# Patient Record
Sex: MALE | Race: WHITE | HISPANIC OR LATINO | URBAN - METROPOLITAN AREA
[De-identification: names, ages, dates, MRNs, and addresses within clinical notes are randomized per-mention and may not be internally consistent; named-entity substitution may affect disease eponyms.]

---

## 2023-08-29 ENCOUNTER — OFFICE VISIT (OUTPATIENT)
Dept: URGENT CARE | Facility: URGENT CARE | Age: 28
End: 2023-08-29

## 2023-08-29 ENCOUNTER — APPOINTMENT (OUTPATIENT)
Dept: GENERAL RADIOLOGY | Facility: CLINIC | Age: 28
End: 2023-08-29
Attending: EMERGENCY MEDICINE

## 2023-08-29 ENCOUNTER — HOSPITAL ENCOUNTER (EMERGENCY)
Facility: CLINIC | Age: 28
Discharge: HOME | End: 2023-08-29
Attending: EMERGENCY MEDICINE | Admitting: EMERGENCY MEDICINE

## 2023-08-29 VITALS
DIASTOLIC BLOOD PRESSURE: 67 MMHG | TEMPERATURE: 98.3 F | RESPIRATION RATE: 16 BRPM | HEART RATE: 65 BPM | SYSTOLIC BLOOD PRESSURE: 131 MMHG | OXYGEN SATURATION: 99 %

## 2023-08-29 DIAGNOSIS — S61.323A: Primary | ICD-10-CM

## 2023-08-29 DIAGNOSIS — S61.312A LACERATION OF RIGHT MIDDLE FINGER WITHOUT FOREIGN BODY WITH DAMAGE TO NAIL, INITIAL ENCOUNTER: ICD-10-CM

## 2023-08-29 DIAGNOSIS — S62.639B OPEN FRACTURE OF TUFT OF DISTAL PHALANX OF FINGER: ICD-10-CM

## 2023-08-29 PROCEDURE — 12001 RPR S/N/AX/GEN/TRNK 2.5CM/<: CPT

## 2023-08-29 PROCEDURE — 90715 TDAP VACCINE 7 YRS/> IM: CPT | Performed by: EMERGENCY MEDICINE

## 2023-08-29 PROCEDURE — 90471 IMMUNIZATION ADMIN: CPT | Performed by: EMERGENCY MEDICINE

## 2023-08-29 PROCEDURE — 250N000013 HC RX MED GY IP 250 OP 250 PS 637: Performed by: EMERGENCY MEDICINE

## 2023-08-29 PROCEDURE — 73140 X-RAY EXAM OF FINGER(S): CPT | Mod: RT

## 2023-08-29 PROCEDURE — 250N000011 HC RX IP 250 OP 636: Performed by: EMERGENCY MEDICINE

## 2023-08-29 PROCEDURE — 99284 EMERGENCY DEPT VISIT MOD MDM: CPT | Mod: 25

## 2023-08-29 PROCEDURE — 99203 OFFICE O/P NEW LOW 30 MIN: CPT | Performed by: PHYSICIAN ASSISTANT

## 2023-08-29 RX ORDER — OXYCODONE HYDROCHLORIDE 5 MG/1
5 TABLET ORAL ONCE
Status: COMPLETED | OUTPATIENT
Start: 2023-08-29 | End: 2023-08-29

## 2023-08-29 RX ORDER — CEPHALEXIN 500 MG/1
500 CAPSULE ORAL 3 TIMES DAILY
Qty: 15 CAPSULE | Refills: 0 | Status: SHIPPED | OUTPATIENT
Start: 2023-08-29 | End: 2023-09-03

## 2023-08-29 RX ORDER — LIDOCAINE HYDROCHLORIDE 10 MG/ML
5 INJECTION, SOLUTION EPIDURAL; INFILTRATION; INTRACAUDAL; PERINEURAL ONCE
Status: DISCONTINUED | OUTPATIENT
Start: 2023-08-29 | End: 2023-08-29 | Stop reason: HOSPADM

## 2023-08-29 RX ORDER — OXYCODONE HYDROCHLORIDE 5 MG/1
5 TABLET ORAL EVERY 6 HOURS PRN
Qty: 12 TABLET | Refills: 0 | Status: SHIPPED | OUTPATIENT
Start: 2023-08-29 | End: 2023-09-01

## 2023-08-29 RX ORDER — CEPHALEXIN 500 MG/1
500 CAPSULE ORAL ONCE
Status: COMPLETED | OUTPATIENT
Start: 2023-08-29 | End: 2023-08-29

## 2023-08-29 RX ORDER — BUPIVACAINE HYDROCHLORIDE 5 MG/ML
INJECTION, SOLUTION EPIDURAL; INTRACAUDAL
Status: DISCONTINUED
Start: 2023-08-29 | End: 2023-08-29 | Stop reason: HOSPADM

## 2023-08-29 RX ORDER — IBUPROFEN 600 MG/1
600 TABLET, FILM COATED ORAL ONCE
Status: COMPLETED | OUTPATIENT
Start: 2023-08-29 | End: 2023-08-29

## 2023-08-29 RX ADMIN — OXYCODONE HYDROCHLORIDE 5 MG: 5 TABLET ORAL at 15:45

## 2023-08-29 RX ADMIN — CLOSTRIDIUM TETANI TOXOID ANTIGEN (FORMALDEHYDE INACTIVATED), CORYNEBACTERIUM DIPHTHERIAE TOXOID ANTIGEN (FORMALDEHYDE INACTIVATED), BORDETELLA PERTUSSIS TOXOID ANTIGEN (GLUTARALDEHYDE INACTIVATED), BORDETELLA PERTUSSIS FILAMENTOUS HEMAGGLUTININ ANTIGEN (FORMALDEHYDE INACTIVATED), BORDETELLA PERTUSSIS PERTACTIN ANTIGEN, AND BORDETELLA PERTUSSIS FIMBRIAE 2/3 ANTIGEN 0.5 ML: 5; 2; 2.5; 5; 3; 5 INJECTION, SUSPENSION INTRAMUSCULAR at 14:48

## 2023-08-29 RX ADMIN — CEPHALEXIN 500 MG: 500 CAPSULE ORAL at 14:48

## 2023-08-29 RX ADMIN — IBUPROFEN 600 MG: 600 TABLET ORAL at 15:44

## 2023-08-29 ASSESSMENT — ACTIVITIES OF DAILY LIVING (ADL): ADLS_ACUITY_SCORE: 35

## 2023-08-29 NOTE — ED TRIAGE NOTES
Pt here with injury to R hand, third digit, put got finger caught in car door. Bleeding controlled. CMS intact. Unsure of last TDAP.

## 2023-08-29 NOTE — DISCHARGE INSTRUCTIONS
STITCHES OUT in 7 DAYS at PCP office or emergency room      Use tylenol and/or ibuprofen for pain or discomfort    Use Oxycodone for severe pain uncontrolled by above medications    Opioid Medication Information  You have been given a prescription for an opioid (narcotic) pain medicine and/or have received a pain medicine while here in the emergency department. These medicines can make you drowsy or impaired. You must not drive, operate dangerous equipment, or engage in any other dangerous activities while taking these medications. If you drive while taking these medications, you could be arrested for DUI, or driving under the influence. Do not drink any alcohol while you are taking these medications.   Opioid pain medications can cause addiction. If you have a history of chemical dependency of any type, you are at a higher risk of becoming addicted to pain medications. Only take these prescribed medications to treat your pain when all other options have been tried. Take it for as short a time and as few doses as possible. Store your pain pills in a secure place, as they are frequently stolen and provide a dangerous opportunity for children or visitors in your house to start abusing these powerful medications. We will not replace any lost or stolen medicine. As soon as your pain is better, you should flush all your remaining medication.   Many prescription pain medications contain Tylenol (acetaminophen), including Vicodin, Tylenol #3, Norco, Lortab, and Percocet. You should not take any extra pills of Tylenol if you are using these prescription medications or you can get very sick. Do not ever take more than 4000 mg of acetaminophen in any 24 hour period.  All opioids tend to cause constipation. Drink plenty of water and eat foods that have a lot of fiber, such as fruits, vegetables, prune juice, apple juice and high fiber cereal. Take a laxative if you don t move your bowels at least every other day. Miralax,  Milk of Magnesia, Colace, or Senna can be used to keep you regular.

## 2023-08-29 NOTE — ED PROVIDER NOTES
History   Chief Complaint:  Hand Pain (R hand, third digit )    JASON Rodriguez is a right-handed 28 year old male presenting to the emergency department for evaluation of right third finger injury. Aparna reports that he was putting a car door in when the finger accidentally pinched between the door and the door jam. He does not know when his tetanus vaccination status was last updated.     Independent Historian:   None - Patient Only    Review of External Notes:        Medications:    The patient has no known daily or prescription medications.     Past Medical History:    The patient has no known past pertinent medical history.     Physical Exam   No data found.    Physical Exam    Extremity: Right middle finger there is a 1 cm and 1 he has had 8 mm paronychia wound, nail is intact avulsed from underneath the eponychial fold.  Deformity of the distal tip of the distal phalanx.  Distal sensation intact, FDS FDP extensor tendon intact.  Remainder the hand unremarkable.  CV: ppi, regular   Resp: speaking in full sentences without any resp distress  Skin: warm dry well perfused  Neuro: Alert, no gross motor or sensory deficits,  gait stable      Emergency Department Course     Imaging:  XR Finger Right G/E 2 Views   Final Result   IMPRESSION: There is an acute fracture of the tuft of the middle   finger distal phalanx with approximately 3 mm of volar displacement of   the distal fragment on the lateral view. Normal joint spacing.      MARILEE ROGERS MD            SYSTEM ID:  [id number]         Report per radiology    Procedures    Digital Block       Procedure: Digital Block  Indication: Wound care  Consent: Verbal  Preparation: Alcohol  Anesthesia: A digital block was performed with Bupivacaine - 0.5% on the affected digit.   Location: R third (middle) finger  Procedure Detail: A digital block was performed on the indicated digit with the above anesthetic.   Patient status: The patient tolerated the  procedure well: Yes. There were no complications.       Laceration Repair    Procedure: Laceration Repair  Indication: Laceration  Consent: Verbal  Location: Right R third (middle) finger  Length: 1 cm and 8mm  Preparation: Irrigation with Sterile Saline.  Anesthesia/Sedation: As above    Treatment/Exploration: Wound explored, no foreign bodies found   Closure: The wound was closed with one layer. Skin/superficial layer was closed with 6 x 4-0 Nylon using Interrupted sutures.   Patient Status: The patient tolerated the procedure well: Yes. There were no complications.     Emergency Department Course & Assessments:    Interventions:  Medications   Tdap (tetanus-diphtheria-acell pertussis) (ADACEL) injection 0.5 mL (0.5 mLs Intramuscular $Given 23 1448)   cephALEXin (KEFLEX) capsule 500 mg (500 mg Oral $Given 23 1448)   ibuprofen (ADVIL/MOTRIN) tablet 600 mg (600 mg Oral $Given 23 1544)   oxyCODONE (ROXICODONE) tablet 5 mg (5 mg Oral $Given 23 1545)      Assessments:  1432 I obtained history and examined the patient as noted above.    1454 I rechecked the patient. Digital block as noted above.   1609 I rechecked the patient. Laceration repair as noted above. I discussed findings and discharge with the patient. All questions answered.      Independent Interpretation (X-rays, CTs, rhythm strip):  Fracture noted on x-ray.     Consultations/Discussion of Management or Tests:  None     Social Determinants of Health affecting care:   None    Disposition:  The patient was discharged to home.     Impression & Plan      Medical Decision Makin-year-old male right long finger distal fingertip injury, nail avulsed underneath the eponychial fold.  Associated tuft fracture.  Updated tetanus, short course of antibiotics, wound repaired here which will be approximately tissues as well as a reapproximate the wound.  Follow-up with hand and give him a fingertip splint.    Diagnosis:    ICD-10-CM    1. Open  fracture of tuft of distal phalanx of finger  S62.639B       2. Laceration of right middle finger without foreign body with damage to nail, initial encounter  S61.312A         Discharge Medications:  Discharge Medication List as of 8/29/2023  4:11 PM        START taking these medications    Details   cephALEXin (KEFLEX) 500 MG capsule Take 1 capsule (500 mg) by mouth 3 times daily for 5 days, Disp-15 capsule, R-0, Local Print      oxyCODONE (ROXICODONE) 5 MG tablet Take 1 tablet (5 mg) by mouth every 6 hours as needed for pain, Disp-12 tablet, R-0, Local Print            Scribe Disclosure:  I, Alisha Bland, am serving as a scribe at 2:37 PM on 8/29/2023 to document services personally performed by Fausto Castillo MD based on my observations and the provider's statements to me.     8/29/2023   Fausto Castillo MD Walker, Jerome Richard, MD  08/29/23 [id number]       Fausto Castillo MD  09/05/23 [id number]